# Patient Record
Sex: MALE | Race: WHITE | URBAN - NONMETROPOLITAN AREA
[De-identification: names, ages, dates, MRNs, and addresses within clinical notes are randomized per-mention and may not be internally consistent; named-entity substitution may affect disease eponyms.]

---

## 2017-05-15 ENCOUNTER — DOCTOR'S OFFICE (OUTPATIENT)
Dept: URBAN - NONMETROPOLITAN AREA CLINIC 1 | Facility: CLINIC | Age: 37
Setting detail: OPHTHALMOLOGY
End: 2017-05-15
Payer: COMMERCIAL

## 2017-05-15 ENCOUNTER — RX ONLY (RX ONLY)
Age: 37
End: 2017-05-15

## 2017-05-15 DIAGNOSIS — H25.11: ICD-10-CM

## 2017-05-15 DIAGNOSIS — H43.812: ICD-10-CM

## 2017-05-15 DIAGNOSIS — H44.112: ICD-10-CM

## 2017-05-15 DIAGNOSIS — H27.02: ICD-10-CM

## 2017-05-15 DIAGNOSIS — H43.811: ICD-10-CM

## 2017-05-15 PROCEDURE — 92226 OPHTHALMOSCOPY EXT SUBSEQUENT: CPT | Performed by: OPHTHALMOLOGY

## 2017-05-15 PROCEDURE — 92134 CPTRZ OPH DX IMG PST SGM RTA: CPT | Performed by: OPHTHALMOLOGY

## 2017-05-15 PROCEDURE — 92014 COMPRE OPH EXAM EST PT 1/>: CPT | Performed by: OPHTHALMOLOGY

## 2017-05-15 ASSESSMENT — CONFRONTATIONAL VISUAL FIELD TEST (CVF)
OD_FINDINGS: FULL
OS_FINDINGS: FULL

## 2017-05-15 ASSESSMENT — REFRACTION_CURRENTRX
OS_OVR_VA: 20/
OD_OVR_VA: 20/
OS_OVR_VA: 20/
OS_OVR_VA: 20/
OD_OVR_VA: 20/
OD_OVR_VA: 20/

## 2017-05-15 ASSESSMENT — REFRACTION_MANIFEST
OD_VA2: 20/
OS_VA2: 20/
OD_VA1: 20/
OD_VA3: 20/
OU_VA: 20/
OS_VA2: 20/
OD_VA3: 20/
OD_VA1: 20/
OS_VA1: 20/
OU_VA: 20/
OU_VA: 20/
OD_VA1: 20/
OS_VA3: 20/
OS_VA1: 20/
OS_VA3: 20/
OD_VA2: 20/
OD_VA2: 20/
OS_VA2: 20/
OD_VA3: 20/
OS_VA1: 20/
OS_VA3: 20/

## 2017-05-15 ASSESSMENT — VISUAL ACUITY
OD_BCVA: 20/400+1
OS_BCVA: 20/20

## 2017-05-15 ASSESSMENT — CORNEAL SURGICAL SCARRING: OS_SCARRING: STROMAL

## 2018-03-22 ENCOUNTER — DOCTOR'S OFFICE (OUTPATIENT)
Dept: URBAN - NONMETROPOLITAN AREA CLINIC 1 | Facility: CLINIC | Age: 38
Setting detail: OPHTHALMOLOGY
End: 2018-03-22
Payer: COMMERCIAL

## 2018-03-22 DIAGNOSIS — H27.02: ICD-10-CM

## 2018-03-22 DIAGNOSIS — H25.11: ICD-10-CM

## 2018-03-22 DIAGNOSIS — H43.812: ICD-10-CM

## 2018-03-22 DIAGNOSIS — H43.813: ICD-10-CM

## 2018-03-22 DIAGNOSIS — H43.811: ICD-10-CM

## 2018-03-22 DIAGNOSIS — H44.112: ICD-10-CM

## 2018-03-22 PROCEDURE — 92134 CPTRZ OPH DX IMG PST SGM RTA: CPT | Performed by: OPHTHALMOLOGY

## 2018-03-22 PROCEDURE — 92226 OPHTHALMOSCOPY EXT SUBSEQUENT: CPT | Performed by: OPHTHALMOLOGY

## 2018-03-22 PROCEDURE — 92014 COMPRE OPH EXAM EST PT 1/>: CPT | Performed by: OPHTHALMOLOGY

## 2018-03-22 ASSESSMENT — REFRACTION_CURRENTRX
OS_OVR_VA: 20/
OS_OVR_VA: 20/
OD_OVR_VA: 20/
OD_OVR_VA: 20/
OS_OVR_VA: 20/
OD_OVR_VA: 20/

## 2018-03-22 ASSESSMENT — CORNEAL SURGICAL SCARRING: OS_SCARRING: STROMAL

## 2018-03-22 ASSESSMENT — REFRACTION_MANIFEST
OD_VA1: 20/
OS_VA3: 20/
OS_VA1: 20/
OS_VA2: 20/
OD_VA1: 20/
OD_VA2: 20/
OD_VA2: 20/
OD_VA3: 20/
OD_VA3: 20/
OS_VA1: 20/
OU_VA: 20/
OS_VA2: 20/
OD_VA2: 20/
OD_VA1: 20/
OS_VA1: 20/
OU_VA: 20/
OU_VA: 20/
OS_VA3: 20/
OS_VA2: 20/
OS_VA3: 20/
OD_VA3: 20/

## 2018-03-22 ASSESSMENT — CONFRONTATIONAL VISUAL FIELD TEST (CVF)
OD_FINDINGS: FULL
OS_FINDINGS: FULL

## 2018-03-22 ASSESSMENT — VISUAL ACUITY
OD_BCVA: 20/400
OS_BCVA: 20/20

## 2019-05-16 ENCOUNTER — DOCTOR'S OFFICE (OUTPATIENT)
Dept: URBAN - NONMETROPOLITAN AREA CLINIC 1 | Facility: CLINIC | Age: 39
Setting detail: OPHTHALMOLOGY
End: 2019-05-16
Payer: COMMERCIAL

## 2019-05-16 DIAGNOSIS — H44.112: ICD-10-CM

## 2019-05-16 DIAGNOSIS — H43.812: ICD-10-CM

## 2019-05-16 DIAGNOSIS — H43.813: ICD-10-CM

## 2019-05-16 DIAGNOSIS — H25.11: ICD-10-CM

## 2019-05-16 DIAGNOSIS — H43.811: ICD-10-CM

## 2019-05-16 PROCEDURE — 92014 COMPRE OPH EXAM EST PT 1/>: CPT | Performed by: OPHTHALMOLOGY

## 2019-05-16 PROCEDURE — 92226 OPHTHALMOSCOPY EXT SUBSEQUENT: CPT | Performed by: OPHTHALMOLOGY

## 2019-05-16 ASSESSMENT — REFRACTION_CURRENTRX
OS_OVR_VA: 20/
OD_OVR_VA: 20/
OS_OVR_VA: 20/
OS_OVR_VA: 20/

## 2019-05-16 ASSESSMENT — REFRACTION_MANIFEST
OD_VA3: 20/
OS_VA1: 20/
OU_VA: 20/
OU_VA: 20/
OS_VA1: 20/
OS_VA3: 20/
OD_VA1: 20/
OD_VA2: 20/
OD_VA1: 20/
OD_VA2: 20/
OD_VA3: 20/
OS_VA2: 20/
OS_VA2: 20/
OS_VA3: 20/

## 2019-05-16 ASSESSMENT — VISUAL ACUITY
OS_BCVA: 20/20
OD_BCVA: 20/400

## 2019-05-16 ASSESSMENT — CORNEAL SURGICAL SCARRING: OS_SCARRING: STROMAL

## 2019-05-16 ASSESSMENT — CONFRONTATIONAL VISUAL FIELD TEST (CVF)
OD_FINDINGS: FULL
OS_FINDINGS: FULL

## 2020-01-20 ENCOUNTER — DOCTOR'S OFFICE (OUTPATIENT)
Dept: URBAN - NONMETROPOLITAN AREA CLINIC 1 | Facility: CLINIC | Age: 40
Setting detail: OPHTHALMOLOGY
End: 2020-01-20
Payer: COMMERCIAL

## 2020-01-20 DIAGNOSIS — H44.112: ICD-10-CM

## 2020-01-20 DIAGNOSIS — H25.11: ICD-10-CM

## 2020-01-20 DIAGNOSIS — H43.813: ICD-10-CM

## 2020-01-20 PROCEDURE — 92134 CPTRZ OPH DX IMG PST SGM RTA: CPT | Performed by: OPHTHALMOLOGY

## 2020-01-20 PROCEDURE — 92014 COMPRE OPH EXAM EST PT 1/>: CPT | Performed by: OPHTHALMOLOGY

## 2020-01-20 PROCEDURE — 92201 OPSCPY EXTND RTA DRAW UNI/BI: CPT | Performed by: OPHTHALMOLOGY

## 2020-01-20 ASSESSMENT — CONFRONTATIONAL VISUAL FIELD TEST (CVF)
OS_FINDINGS: FULL
OD_FINDINGS: FULL

## 2020-01-20 ASSESSMENT — CORNEAL SURGICAL SCARRING: OS_SCARRING: STROMAL

## 2020-01-27 ENCOUNTER — DOCTOR'S OFFICE (OUTPATIENT)
Dept: URBAN - NONMETROPOLITAN AREA CLINIC 1 | Facility: CLINIC | Age: 40
Setting detail: OPHTHALMOLOGY
End: 2020-01-27
Payer: COMMERCIAL

## 2020-01-27 DIAGNOSIS — S05.22XD: ICD-10-CM

## 2020-01-27 PROCEDURE — 92012 INTRM OPH EXAM EST PATIENT: CPT | Performed by: OPHTHALMOLOGY

## 2020-01-27 PROCEDURE — 92083 EXTENDED VISUAL FIELD XM: CPT | Performed by: OPHTHALMOLOGY

## 2020-01-27 ASSESSMENT — VISUAL ACUITY
OS_BCVA: 20/20-2
OD_BCVA: 20/400
OD_BCVA: 20/400
OS_BCVA: 20/20-2

## 2020-01-27 ASSESSMENT — CORNEAL SURGICAL SCARRING: OS_SCARRING: STROMAL

## 2020-02-13 ENCOUNTER — DOCTOR'S OFFICE (OUTPATIENT)
Dept: URBAN - NONMETROPOLITAN AREA CLINIC 1 | Facility: CLINIC | Age: 40
Setting detail: OPHTHALMOLOGY
End: 2020-02-13
Payer: COMMERCIAL

## 2020-02-13 ENCOUNTER — OPTICAL OFFICE (OUTPATIENT)
Dept: URBAN - NONMETROPOLITAN AREA CLINIC 4 | Facility: CLINIC | Age: 40
Setting detail: OPHTHALMOLOGY
End: 2020-02-13

## 2020-02-13 DIAGNOSIS — H43.812: ICD-10-CM

## 2020-02-13 DIAGNOSIS — H43.811: ICD-10-CM

## 2020-02-13 DIAGNOSIS — H27.02: ICD-10-CM

## 2020-02-13 DIAGNOSIS — H53.2: ICD-10-CM

## 2020-02-13 DIAGNOSIS — H52.7: ICD-10-CM

## 2020-02-13 DIAGNOSIS — H50.012: ICD-10-CM

## 2020-02-13 DIAGNOSIS — S05.22XD: ICD-10-CM

## 2020-02-13 DIAGNOSIS — H53.002: ICD-10-CM

## 2020-02-13 PROCEDURE — FRESNELPRI FRENSEL PRISM USED: Performed by: OPHTHALMOLOGY

## 2020-02-13 PROCEDURE — 92060 SENSORIMOTOR EXAMINATION: CPT | Performed by: OPHTHALMOLOGY

## 2020-02-13 PROCEDURE — V2100 LENS SPHER SINGLE PLANO 4.00: HCPCS | Performed by: OPHTHALMOLOGY

## 2020-02-13 PROCEDURE — 92012 INTRM OPH EXAM EST PATIENT: CPT | Performed by: OPHTHALMOLOGY

## 2020-02-13 ASSESSMENT — REFRACTION_CURRENTRX
OS_VPRISM: BD
OD_OVR_VA: 20/
OD_SPHERE: -0.25
OS_HPRISM: 5
OD_CYLINDER: -0.75
OS_OVR_VA: 20/
OD_AXIS: 082
OS_SPHERE: PLANO
OD_HPRISM: 4.5
OD_HPRISM_DIRECTION: BO
OS_HPRISM_DIRECTION: BO

## 2020-02-13 ASSESSMENT — CONFRONTATIONAL VISUAL FIELD TEST (CVF)
OD_FINDINGS: FULL
OS_FINDINGS: FULL

## 2020-02-13 ASSESSMENT — CORNEAL SURGICAL SCARRING: OS_SCARRING: CENTRAL STROMAL

## 2020-02-13 ASSESSMENT — REFRACTION_AUTOREFRACTION
OD_AXIS: 032
OD_CYLINDER: -1.50
OD_SPHERE: -0.25

## 2020-02-13 ASSESSMENT — VISUAL ACUITY
OS_BCVA: 20/20
OD_BCVA: 20/400

## 2020-02-13 ASSESSMENT — SPHEQUIV_DERIVED: OD_SPHEQUIV: -1

## 2023-09-30 ENCOUNTER — OFFICE VISIT (OUTPATIENT)
Dept: URGENT CARE | Facility: CLINIC | Age: 43
End: 2023-09-30

## 2023-09-30 VITALS
HEIGHT: 74 IN | RESPIRATION RATE: 16 BRPM | SYSTOLIC BLOOD PRESSURE: 173 MMHG | BODY MASS INDEX: 29.52 KG/M2 | OXYGEN SATURATION: 98 % | TEMPERATURE: 98 F | WEIGHT: 230 LBS | HEART RATE: 88 BPM | DIASTOLIC BLOOD PRESSURE: 99 MMHG

## 2023-09-30 DIAGNOSIS — R19.7 DIARRHEA, UNSPECIFIED TYPE: Primary | ICD-10-CM

## 2023-09-30 NOTE — PROGRESS NOTES
[facility]        NAME: Isabel Shore is a 39 y.o. male  : 1980    MRN: [id number]  DATE: 2023  TIME: 9:35 AM    Assessment and Plan   Diarrhea, unspecified type [R19.7]  1. Diarrhea, unspecified type              Patient Instructions     Take over the counter immodium as needed  Drink plenty of fluidsFollow up with PCP in 3-5 days. Proceed to  ER if symptoms worsen. Chief Complaint     Chief Complaint   Patient presents with   • Diarrhea     Started Tuesday vomited on Wednesday food from previous night  states his bowels are very watery got better yesterday and then started again throughout the night          History of Present Illness       Patient is a 43YOM with no significant medical history presenting with diarrhea since Tuesday. He states the diarrhea has been intermittent for the last couple days. He has been able to eat but then has loose stools. He denies any fever, chills, abdominal pain, recent antibiotic use. No blood or mucus in his stool. Diarrhea   Pertinent negatives include no abdominal pain, chills, fever or vomiting. Review of Systems   Review of Systems   Constitutional: Negative for activity change, appetite change, chills, fatigue and fever. Gastrointestinal: Positive for diarrhea. Negative for abdominal pain, blood in stool, nausea and vomiting. All other systems reviewed and are negative. Current Medications     No current outpatient medications on file. Current Allergies     Allergies as of 2023   • (Not on File)            The following portions of the patient's history were reviewed and updated as appropriate: allergies, current medications, past family history, past medical history, past social history, past surgical history and problem list.     No past medical history on file. No past surgical history on file. No family history on file. Medications have been verified.         Objective   BP (!) 173/99   Pulse 88   Temp 98 °F (36.7 °C)   Resp 16   Ht 6' 2" (1.88 m)   Wt 104 kg (230 lb)   SpO2 98%   BMI 29.53 kg/m²        Physical Exam     Physical Exam  Vitals and nursing note reviewed. Constitutional:       General: He is not in acute distress. Appearance: Normal appearance. He is normal weight. He is not ill-appearing or toxic-appearing. HENT:      Mouth/Throat:      Mouth: Mucous membranes are moist.      Pharynx: Oropharynx is clear. Cardiovascular:      Rate and Rhythm: Normal rate. Pulses: Normal pulses. Pulmonary:      Effort: Pulmonary effort is normal.   Abdominal:      General: There is no distension. Tenderness: There is no abdominal tenderness. There is no guarding or rebound. Skin:     General: Skin is warm. Capillary Refill: Capillary refill takes less than 2 seconds. Neurological:      General: No focal deficit present. Mental Status: He is alert.

## 2025-06-12 ENCOUNTER — OFFICE VISIT (OUTPATIENT)
Dept: URGENT CARE | Facility: CLINIC | Age: 45
End: 2025-06-12
Payer: COMMERCIAL

## 2025-06-12 VITALS
TEMPERATURE: 99 F | HEART RATE: 109 BPM | DIASTOLIC BLOOD PRESSURE: 89 MMHG | SYSTOLIC BLOOD PRESSURE: 141 MMHG | WEIGHT: 240 LBS | RESPIRATION RATE: 20 BRPM | HEIGHT: 74 IN | OXYGEN SATURATION: 98 % | BODY MASS INDEX: 30.8 KG/M2

## 2025-06-12 DIAGNOSIS — S30.863A TICK BITE OF SCROTUM, INITIAL ENCOUNTER: Primary | ICD-10-CM
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
## 2025-06-12 DIAGNOSIS — R00.2 INTERMITTENT PALPITATIONS: ICD-10-CM

## 2025-06-12 DIAGNOSIS — R21 SKIN RASH: ICD-10-CM

## 2025-06-12 PROCEDURE — 99213 OFFICE O/P EST LOW 20 MIN: CPT

## 2025-06-12 RX ORDER — TRIAMCINOLONE ACETONIDE 1 MG/G
CREAM TOPICAL 2 TIMES DAILY
Qty: 28.4 G | Refills: 0 | Status: SHIPPED | OUTPATIENT
Start: 2025-06-12

## 2025-06-12 RX ORDER — DOXYCYCLINE 100 MG/1
200 CAPSULE ORAL ONCE
Qty: 2 CAPSULE | Refills: 0 | Status: SHIPPED | OUTPATIENT
Start: 2025-06-12 | End: 2025-06-12

## 2025-06-12 NOTE — PROGRESS NOTES
[facility]        NAME: Yoel Cornell is a 45 y.o. male  : 1980    MRN: [id number]  DATE: 2025  TIME: 11:14 AM    Assessment and Plan   Tick bite of scrotum, initial encounter [S30.863A, W57.XXXA]  1. Tick bite of scrotum, initial encounter  doxycycline monohydrate (MONODOX) 100 mg capsule      2. Intermittent palpitations        3. Skin rash  triamcinolone (KENALOG) 0.1 % cream        Aside from rash present to bilateral lower extremities, remainder of physical examination is relatively unremarkable at this time.  No acute red flag findings on physical examination.  Offered to perform EKG in clinic setting to rule out any underlying cardiac arrhythmia that could be contributing to the patient's symptoms however he declines at this time stating that he will follow-up with his PCP.  Although there is no confirmed tick bite, patient is confident that he has experienced a bite and so was requesting doxycycline at this time for Lyme prevention.  Plan to treat with one-time dose of doxycycline at this time.  Rash present to bilateral lower extremities is of unknown origin at this time however does not appear to be bacterial or fungal in nature.  Rash appears to be petechial so warrants further workup/lab work, however will prescribe triamcinolone cream for topical use should the rash become irritated/itchy.  Encouraged patient to follow-up closely with PCP for follow-up of current symptoms and to obtain Lyme testing if he remains concerned about this. Tylenol/ibuprofen for pain/fever should these symptoms develop. Increased fluids & rest. Ensure adequate hydration while working outside in the heat. May continue with other OTC and supportive therapies at home. Encouraged to follow up closely with family physician or healthcare provider. Strict ED precautions provided/discussed. Patient in agreement with plan & verbalized understanding.         Patient Instructions   Take antibiotics as  "prescribed, being sure to complete full course of therapy even if you feel better!    Eat yogurt with live and active cultures and/or take a probiotic at least 3 hours before or after antibiotic dose. Monitor stool for diarrhea and/or blood. If this occurs, contact primary care doctor ASAP.      Any remaining tick parts will naturally slough off as the skin epithelializes. Wear deet or picaridin spray when going out into the woods, wear long socks, and tuck pants into socks. Take clothes off right away and inspect clothes and body for presence of ticks. You may purchase an easy removal tick instrument to help with at home tick removal. Ticks may be sent out for Lyme testing! Proceed to \"[email or web address]\" for more information on how to complete this.    Triamcinolone cream as prescribed.  Keep area clean and dry.  Watch for signs of infection.  Monitor for development of rash consistent with bullseye appearance. This may occur anywhere on the body, not always necessarily in the site that the tick bite occurred in.      Ensure close follow-up with PCP for possible Lyme testing and follow-up.    Should you develop difficulty breathing, chest pain, worsening palpitations, or overall worsening of symptoms, proceed to the ED for further evaluation.    Follow up with PCP in 3-5 days.  Proceed to  ER if symptoms worsen.    If tests have been performed at [facility], our office will contact you with results if changes need to be made to the care plan discussed with you at the visit.  You can review your full results on [facility].    Chief Complaint     Chief Complaint   Patient presents with    Rash     On bilateral lower leg starting Monday night. States he was on a tar roof all of Monday. States rash has started to improve today.     Fatigue     Reporting fatigue, body aches, hot flashes, racing heart, and headaches starting Sunday.         History of Present Illness       Patient is a 45-year-old male who presents today " with concern for possible Lyme disease.  He states that approximately 4 to 5 days ago he began noticing an intermittent itching to his scrotum and although was not able to see and remove a tick is fairly confident that he had a tick bite due to the fact that [place] the [place] and is constantly working outside with history of same.  He reports that shortly after noticing this onset he also began developing rash to bilateral lower legs and has been experiencing intermittent palpitations with associated fatigue, body aches, hot flashes, and headaches.  He reports that the rash on his legs developed shortly after working on a hot roof with tar, however he denies getting any tar on his skin.  He states that the rash is not painful nor does not itch or drain.  Patient reports that he works outside often in the heat however remains well-hydrated.  He reports that the palpitations he has been experiencing are intermittent, however states he has been getting them approximately once daily.  He denies experiencing any chest pain, dizziness, lightheadedness, shortness of breath, or near syncope with palpitations however does report that when they occur he does feel diaphoretic and fatigued.  This is his first time being evaluated for this.  Patient reports that he follows closely with his PCP and recently had an evaluation that was unremarkable.    Rash  This is a new problem. The current episode started in the past 7 days (x4 days). The problem has been gradually improving since onset. The affected locations include the left lower leg and right lower leg. The problem is moderate. The rash is characterized by redness. Associated with: Tar. Associated symptoms include fatigue. Pertinent negatives include no congestion, cough, decreased physical activity, decreased responsiveness, decreased sleep, drinking less, diarrhea, facial edema, fever, itching, joint pain, rhinorrhea, shortness of breath, sore throat or vomiting. Past  treatments include nothing. There were no sick contacts.   Fatigue  This is a new problem. The current episode started in the past 7 days. The problem occurs intermittently. The problem has been waxing and waning. Associated symptoms include diaphoresis, fatigue, headaches, myalgias and a rash. Pertinent negatives include no abdominal pain, arthralgias, change in bowel habit, chest pain, chills, congestion, coughing, fever, joint swelling, nausea, neck pain, numbness, sore throat, swollen glands, vertigo, visual change, vomiting or weakness. Nothing aggravates the symptoms. He has tried nothing for the symptoms.       Review of Systems   Review of Systems   Constitutional:  Positive for diaphoresis and fatigue. Negative for activity change, appetite change, chills, decreased responsiveness and fever.   HENT:  Negative for congestion, ear pain, rhinorrhea, sore throat and trouble swallowing.    Eyes:  Negative for photophobia, pain, redness, itching and visual disturbance.   Respiratory:  Negative for cough, chest tightness, shortness of breath and wheezing.    Cardiovascular:  Positive for palpitations (intermittent - denies experiencing currently). Negative for chest pain and leg swelling.   Gastrointestinal:  Negative for abdominal pain, change in bowel habit, diarrhea, nausea and vomiting.   Musculoskeletal:  Positive for myalgias. Negative for arthralgias, back pain, gait problem, joint pain, joint swelling, neck pain and neck stiffness.   Skin:  Positive for color change and rash. Negative for itching, pallor and wound.   Neurological:  Positive for headaches. Negative for dizziness, vertigo, tremors, syncope, facial asymmetry, speech difficulty, weakness, light-headedness and numbness.   Hematological:  Negative for adenopathy.   Psychiatric/Behavioral:  Negative for confusion.          Current Medications     Current Medications[1]    Current Allergies     Allergies as of 06/12/2025 - Reviewed 06/12/2025  "  Allergen Reaction Noted    Iodinated contrast media Other (See Comments) 10/29/2010            The following portions of the patient's history were reviewed and updated as appropriate: allergies, current medications, past family history, past medical history, past social history, past surgical history and problem list.     Past Medical History[2]    Past Surgical History[3]    Family History[4]      Medications have been verified.        Objective   /89   Pulse (!) 109   Temp 99 °F (37.2 °C)   Resp 20   Ht 6' 2\" (1.88 m)   Wt 109 kg (240 lb)   SpO2 98%   BMI 30.81 kg/m²   No LMP for male patient.       Physical Exam     Physical Exam  Vitals and nursing note reviewed.   Constitutional:       General: He is not in acute distress.     Appearance: Normal appearance. He is not ill-appearing, toxic-appearing or diaphoretic.   HENT:      Head: Normocephalic and atraumatic.      Right Ear: Tympanic membrane, ear canal and external ear normal.      Left Ear: Tympanic membrane, ear canal and external ear normal.      Nose: Nose normal.      Mouth/Throat:      Mouth: Mucous membranes are moist.      Pharynx: Oropharynx is clear.     Eyes:      Extraocular Movements: Extraocular movements intact.      Conjunctiva/sclera: Conjunctivae normal.      Pupils: Pupils are equal, round, and reactive to light.       Cardiovascular:      Rate and Rhythm: Regular rhythm. Tachycardia present.      Pulses: Normal pulses.      Heart sounds: Normal heart sounds. No murmur heard.     No friction rub. No gallop.   Pulmonary:      Effort: Pulmonary effort is normal. No respiratory distress.      Breath sounds: Normal breath sounds. No stridor. No wheezing, rhonchi or rales.   Chest:      Chest wall: No tenderness.   Abdominal:      General: Abdomen is flat.      Palpations: Abdomen is soft.     Musculoskeletal:         General: No swelling, tenderness, deformity or signs of injury. Normal range of motion.      Cervical back: " Normal range of motion and neck supple. No rigidity or tenderness.   Lymphadenopathy:      Cervical: No cervical adenopathy.     Skin:     General: Skin is warm.      Capillary Refill: Capillary refill takes less than 2 seconds.      Coloration: Skin is not jaundiced or pale.      Findings: Rash present. No bruising, erythema or lesion. Rash is not crusting, macular, nodular, papular, purpuric, pustular, scaling, urticarial or vesicular.      Comments: Raised, red/maroon petechial rash noted to bilateral lower legs. NonTTP. No bleeding/drainage or swelling. Rash not present elsewhere on body.      Neurological:      General: No focal deficit present.      Mental Status: He is alert.      Sensory: No sensory deficit.      Motor: No weakness.      Gait: Gait normal.     Psychiatric:         Mood and Affect: Mood normal.         Behavior: Behavior normal.         Thought Content: Thought content normal.         Judgment: Judgment normal.                        [1]   Current Outpatient Medications:     doxycycline monohydrate (MONODOX) 100 mg capsule, Take 2 capsules (200 mg total) by mouth 1 (one) time for 1 dose, Disp: 2 capsule, Rfl: 0    triamcinolone (KENALOG) 0.1 % cream, Apply topically 2 (two) times a day, Disp: 28.4 g, Rfl: 0  [2] No past medical history on file.  [3] No past surgical history on file.  [4] No family history on file.

## 2025-06-12 NOTE — PATIENT INSTRUCTIONS
"Take antibiotics as prescribed, being sure to complete full course of therapy even if you feel better!    Eat yogurt with live and active cultures and/or take a probiotic at least 3 hours before or after antibiotic dose. Monitor stool for diarrhea and/or blood. If this occurs, contact primary care doctor ASAP.      Any remaining tick parts will naturally slough off as the skin epithelializes. Wear deet or picaridin spray when going out into the woods, wear long socks, and tuck pants into socks. Take clothes off right away and inspect clothes and body for presence of ticks. You may purchase an easy removal tick instrument to help with at home tick removal. Ticks may be sent out for Lyme testing! Proceed to \"[email or web address]\" for more information on how to complete this.    Keep area clean and dry.  Watch for signs of infection.  Monitor for development of rash consistent with bullseye appearance. This may occur anywhere on the body, not always necessarily in the site that the tick bite occurred in.      Ensure close follow-up with PCP for possible Lyme testing and follow-up.    Should you develop difficulty breathing, chest pain, worsening palpitations, or overall worsening of symptoms, proceed to the ED for further evaluation.    Follow up with PCP in 3-5 days.  Proceed to  ER if symptoms worsen.    If tests have been performed at [facility], our office will contact you with results if changes need to be made to the care plan discussed with you at the visit.  You can review your full results on [facility].  "